# Patient Record
Sex: MALE | Race: OTHER | Employment: UNEMPLOYED | URBAN - METROPOLITAN AREA
[De-identification: names, ages, dates, MRNs, and addresses within clinical notes are randomized per-mention and may not be internally consistent; named-entity substitution may affect disease eponyms.]

---

## 2021-06-17 ENCOUNTER — APPOINTMENT (OUTPATIENT)
Dept: OCCUPATIONAL MEDICINE | Age: 21
End: 2021-06-17
Attending: FAMILY MEDICINE

## 2024-05-23 ENCOUNTER — LAB ENCOUNTER (OUTPATIENT)
Dept: LAB | Facility: HOSPITAL | Age: 24
End: 2024-05-23
Attending: INTERNAL MEDICINE

## 2024-05-23 ENCOUNTER — OFFICE VISIT (OUTPATIENT)
Facility: CLINIC | Age: 24
End: 2024-05-23

## 2024-05-23 VITALS
HEART RATE: 89 BPM | DIASTOLIC BLOOD PRESSURE: 76 MMHG | RESPIRATION RATE: 15 BRPM | HEIGHT: 73 IN | SYSTOLIC BLOOD PRESSURE: 128 MMHG | OXYGEN SATURATION: 98 % | WEIGHT: 234 LBS | BODY MASS INDEX: 31.01 KG/M2

## 2024-05-23 DIAGNOSIS — Z77.29 DUST EXPOSURE: ICD-10-CM

## 2024-05-23 DIAGNOSIS — R06.02 SHORTNESS OF BREATH: Primary | ICD-10-CM

## 2024-05-23 DIAGNOSIS — J45.40 MODERATE PERSISTENT ASTHMA WITHOUT COMPLICATION (HCC): ICD-10-CM

## 2024-05-23 DIAGNOSIS — R06.02 SHORTNESS OF BREATH: ICD-10-CM

## 2024-05-23 LAB
BASOPHILS # BLD AUTO: 0.06 X10(3) UL (ref 0–0.2)
BASOPHILS NFR BLD AUTO: 0.8 %
EOSINOPHIL # BLD AUTO: 0.56 X10(3) UL (ref 0–0.7)
EOSINOPHIL NFR BLD AUTO: 7.5 %
ERYTHROCYTE [DISTWIDTH] IN BLOOD BY AUTOMATED COUNT: 13.4 %
HCT VFR BLD AUTO: 43.3 %
HGB BLD-MCNC: 14.4 G/DL
IMM GRANULOCYTES # BLD AUTO: 0.02 X10(3) UL (ref 0–1)
IMM GRANULOCYTES NFR BLD: 0.3 %
LYMPHOCYTES # BLD AUTO: 2.48 X10(3) UL (ref 1–4)
LYMPHOCYTES NFR BLD AUTO: 33.3 %
MCH RBC QN AUTO: 26.7 PG (ref 26–34)
MCHC RBC AUTO-ENTMCNC: 33.3 G/DL (ref 31–37)
MCV RBC AUTO: 80.2 FL
MONOCYTES # BLD AUTO: 0.84 X10(3) UL (ref 0.1–1)
MONOCYTES NFR BLD AUTO: 11.3 %
NEUTROPHILS # BLD AUTO: 3.49 X10 (3) UL (ref 1.5–7.7)
NEUTROPHILS # BLD AUTO: 3.49 X10(3) UL (ref 1.5–7.7)
NEUTROPHILS NFR BLD AUTO: 46.8 %
PLATELET # BLD AUTO: 258 10(3)UL (ref 150–450)
RBC # BLD AUTO: 5.4 X10(6)UL
WBC # BLD AUTO: 7.5 X10(3) UL (ref 4–11)

## 2024-05-23 PROCEDURE — 82785 ASSAY OF IGE: CPT

## 2024-05-23 PROCEDURE — 86003 ALLG SPEC IGE CRUDE XTRC EA: CPT

## 2024-05-23 PROCEDURE — 36415 COLL VENOUS BLD VENIPUNCTURE: CPT

## 2024-05-23 PROCEDURE — 85025 COMPLETE CBC W/AUTO DIFF WBC: CPT

## 2024-05-23 PROCEDURE — 99204 OFFICE O/P NEW MOD 45 MIN: CPT | Performed by: INTERNAL MEDICINE

## 2024-05-23 RX ORDER — IPRATROPIUM BROMIDE AND ALBUTEROL SULFATE 2.5; .5 MG/3ML; MG/3ML
3 SOLUTION RESPIRATORY (INHALATION) EVERY 4 HOURS PRN
Qty: 360 EACH | Refills: 2 | Status: SHIPPED | OUTPATIENT
Start: 2024-05-23

## 2024-05-23 RX ORDER — OMEPRAZOLE 20 MG/1
20 CAPSULE, DELAYED RELEASE ORAL DAILY
COMMUNITY
Start: 2024-05-14

## 2024-05-23 RX ORDER — BUDESONIDE 0.5 MG/2ML
0.5 INHALANT ORAL 2 TIMES DAILY
Qty: 60 EACH | Refills: 3 | Status: SHIPPED | OUTPATIENT
Start: 2024-05-23

## 2024-05-23 RX ORDER — ALBUTEROL SULFATE 90 UG/1
AEROSOL, METERED RESPIRATORY (INHALATION)
COMMUNITY
Start: 2024-03-15

## 2024-05-23 NOTE — PROGRESS NOTES
[facility] General Pulmonary Consult Note    Chief Complaint:  Chief Complaint   Patient presents with    New Patient     Pt c/o dyspnea at rest and minimal exertion x6 months       History of Present Illness:  Jeison Luis is a 23 year old male who presents today for evaluation of shortness of breath.  This has been ongoing since 2023.  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  Tried prednisone back in December had significant side effects such as polyuria, palpitations.  On inspiration feels significant chest tightness and air hunger.  Has lost 13 pounds unintentionally.  Previous smoker, quit when this happened smoked for about 6 years.  History of asthma as a child.  No significant allergies.        Past Medical History:   History reviewed. No pertinent past medical history.     Past Surgical History:   Past Surgical History:   Procedure Laterality Date    Hernia surgery         Family Medical History:   Family History   Problem Relation Age of Onset    High Blood Pressure Mother         Social History:   Social History     Socioeconomic History    Marital status: Single     Spouse name: Not on file    Number of children: Not on file    Years of education: Not on file    Highest education level: Not on file   Occupational History    Not on file   Tobacco Use    Smoking status: Former     Current packs/day: 0.00     Average packs/day: 0.5 packs/day for 5.8 years (2.9 ttl pk-yrs)     Types: Cigarettes     Start date:      Quit date: 2023     Years since quittin.5     Passive exposure: Past    Smokeless tobacco: Never   Substance and Sexual Activity    Alcohol use: Not Currently    Drug use: Not Currently    Sexual activity: Not on file   Other Topics Concern    Not on file   Social History Narrative    Not on file     Social Determinants of Health     Financial Resource Strain: Not on file   Food Insecurity: Not on file   Transportation  Needs: Not on file   Physical Activity: Not on file   Stress: Not on file   Social Connections: Not on file   Housing Stability: Not on file        Allergies: Shellfish allergy     Medications:   Current Outpatient Medications   Medication Sig Dispense Refill    albuterol 108 (90 Base) MCG/ACT Inhalation Aero Soln INHALE 2 PUFFS BY MOUTH FOUR TIMES DAILY AS NEEDED AS DIRECTED      omeprazole 20 MG Oral Capsule Delayed Release Take 1 capsule (20 mg total) by mouth daily.      budesonide 0.5 MG/2ML Inhalation Suspension Take 2 mL (0.5 mg total) by nebulization 2 (two) times daily. 60 each 3    ipratropium-albuterol 0.5-2.5 (3) MG/3ML Inhalation Solution Take 3 mL by nebulization every 4 (four) hours as needed. 360 each 2    Respiratory Therapy Supplies (FULL KIT NEBULIZER SET) Does not apply Misc 1 kit As Directed. 1 each 0       Review of Systems: Review of Systems    Physical Exam:  /76 (BP Location: Right arm, Patient Position: Sitting, Cuff Size: adult)   Pulse 89   Resp 15   Ht 6' 1\" (1.854 m)   Wt 234 lb (106.1 kg)   SpO2 98%   BMI 30.87 kg/m²      Constitutional: alert, cooperative. No acute distress.  HEENT: Head NC/AT. Nares normal. Septum midline. Mucosa normal. No drainage or sinus tenderness.. Mallampati 1+  Cardio: Regular rate and rhythm. Normal S1 and S2. No murmurs.   Respiratory: Thorax symmetrical with no labored breathing. clear to auscultation bilaterally  GI: NABS. Abd soft, non-tender.  Extremities: No clubbing or cyanosis. No BLE edema.    Neurologic: A&Ox3. No gross motor deficits.  Skin: Warm, dry  Psych: Calm, cooperative. Pleasant affect.    Results:  Personally reviewed  No CBC panel on file.     No CMP panel on file.     No results found.     Assessment/Plan:  #1. Shortness of breath/dyspnea on exertion  Concern for mild asthma which is going uncontrolled  Start budesonide nebs BID, duonebs as needed in between, nebulizer would be better options since not feeling like he can  get a deep breath in  Check CT chest  Check PFTs  Check allergen screen    Return in about 6 weeks (around 7/4/2024).    Myke Henderson MD  5/23/2024

## 2024-05-25 LAB
A ALTERNATA IGE QN: <0.1 KUA/L (ref ?–0.1)
A FUMIGATUS IGE QN: <0.1 KUA/L (ref ?–0.1)
AMER SYCAMORE IGE QN: 0.3 KUA/L (ref ?–0.1)
BERMUDA GRASS IGE QN: 1.54 KUA/L (ref ?–0.1)
BOXELDER IGE QN: 0.32 KUA/L (ref ?–0.1)
C HERBARUM IGE QN: <0.1 KUA/L (ref ?–0.1)
CALIF WALNUT IGE QN: 0.4 KUA/L (ref ?–0.1)
CAT DANDER IGE QN: 7.23 KUA/L (ref ?–0.1)
CMN PIGWEED IGE QN: 0.27 KUA/L (ref ?–0.1)
COMMON RAGWEED IGE QN: 14.3 KUA/L (ref ?–0.1)
COTTONWOOD IGE QN: 0.29 KUA/L (ref ?–0.1)
D FARINAE IGE QN: 12.1 KUA/L (ref ?–0.1)
D PTERONYSS IGE QN: 13.5 KUA/L (ref ?–0.1)
DOG DANDER IGE QN: 36.2 KUA/L (ref ?–0.1)
IGE SERPL-ACNC: 278 KU/L (ref 2–214)
M RACEMOSUS IGE QN: <0.1 KUA/L (ref ?–0.1)
MARSH ELDER IGE QN: 1.97 KUA/L (ref ?–0.1)
MOUSE EPITH IGE QN: 9.54 KUA/L (ref ?–0.1)
MT JUNIPER IGE QN: 0.55 KUA/L (ref ?–0.1)
P NOTATUM IGE QN: <0.1 KUA/L (ref ?–0.1)
PECAN/HICK TREE IGE QN: 0.69 KUA/L (ref ?–0.1)
ROACH IGE QN: 4.28 KUA/L (ref ?–0.1)
SALTWORT IGE QN: 0.44 KUA/L (ref ?–0.1)
SILVER BIRCH IGE QN: 0.35 KUA/L (ref ?–0.1)
TIMOTHY IGE QN: 0.8 KUA/L (ref ?–0.1)
WHITE ASH IGE QN: 0.34 KUA/L (ref ?–0.1)
WHITE ELM IGE QN: 1.47 KUA/L (ref ?–0.1)
WHITE MULBERRY IGE QN: 0.18 KUA/L (ref ?–0.1)
WHITE OAK IGE QN: 0.33 KUA/L (ref ?–0.1)

## 2024-05-28 ENCOUNTER — TELEPHONE (OUTPATIENT)
Facility: CLINIC | Age: 24
End: 2024-05-28

## 2024-05-28 NOTE — TELEPHONE ENCOUNTER
Received fax from [employer] via Cover my meds regarding patients Budessonide 0.5mg/2ml. Used (Key: [id number])  Form filled out and sent back.

## 2024-05-30 RX ORDER — BUDESONIDE 90 UG/1
2 AEROSOL, POWDER RESPIRATORY (INHALATION) 2 TIMES DAILY
Qty: 1 EACH | Refills: 5 | Status: SHIPPED | OUTPATIENT
Start: 2024-05-30

## 2024-05-30 NOTE — TELEPHONE ENCOUNTER
Per Dr. Henderson:  change to Pulmicort.  Rx sent.  Attempted call to pt's mobile number but no answer or voicemail.  Detailed message left for pt on VM at pt's home regarding change to inhaler.

## 2024-05-30 NOTE — TELEPHONE ENCOUNTER
Budesnaldo STINSON denied.  Response from plan:    Denied. Unable to approve the medication (BUDESONIDE Suspension 0.5MG/2ML) due to unmet criteria (2,3) as outlined in the plan guideline below. Member must try and fail the following drug alternatives: Pulmicort Flexhaler, Flovent HFA/Diskus, Arnuity Ellipta, Qvar Redihale or Advair Diskus. If you are looking for additional alternatives, please refer to the plan's preferred drug list. This list can be found on the health plan's website. Unless otherwise stated please use generics when available. Plan guideline (HIM.PA.153 Inhaled Agents for Asthma and COPD) requires the following to be met prior to consideration of approval: Inhaled Agents for Asthma or Chronic Obstructive Pulmonary (COPD) (must meet all): 1. Diagnosis of asthma or COPD as Food and Drug administration (FDA)-approved for the requested agent; 2. Age is one of the following (a or b): a. Asthma: Appropriate per the prescribing information for the requested agent; b. COPD: at least 18 years; 3. Failure of the following formulary agent(s) at up to maximally indicated doses, unless clinically significant adverse effects are experienced or all are contraindicated; 4. For requests for an agent with a digital component (e.g., Digihaler products): Medical justification supports necessity of the digital component (i.e., rationale why inhaler usage cannot be tracked manually); 5. Request meets one of the following (a, b, or c): a. Requested quantity does not exceed the health plan quantity limit; b. Requested dose does not exceed the FDA-approved maximum dose for the relevant indication; c. Request is for a [place] member with asthma or other life-threatening bronchial ailments for inhalants prescribed by the treating physician. Note: If you believe the member has met criteria (2,3) as described above, please resubmit your request with additional clinically supportive documentation for consideration.    Please advise  regarding alternative from following list:  Pulmicort Flexhaler,  Arnuity Ellipta, Qvar Redihale or Advair Diskus.

## 2024-06-03 ENCOUNTER — TELEPHONE (OUTPATIENT)
Dept: CASE MANAGEMENT | Age: 24
End: 2024-06-03

## 2024-06-03 ENCOUNTER — HOSPITAL ENCOUNTER (OUTPATIENT)
Dept: GENERAL RADIOLOGY | Age: 24
Discharge: HOME | End: 2024-06-03
Attending: INTERNAL MEDICINE
Payer: COMMERCIAL

## 2024-06-03 DIAGNOSIS — R06.00 DYSPNEA, UNSPECIFIED TYPE: Primary | ICD-10-CM

## 2024-06-03 DIAGNOSIS — R06.00 DYSPNEA, UNSPECIFIED TYPE: ICD-10-CM

## 2024-06-03 PROCEDURE — 71046 X-RAY EXAM CHEST 2 VIEWS: CPT | Performed by: INTERNAL MEDICINE

## 2024-06-03 NOTE — TELEPHONE ENCOUNTER
Sasha    Jeison is scheduled for his chest CT today, 6/3/24 at 3:00, and it is in a P2P status.    If you would like to do a peer to peer you can call [facility] [phone] , and use Tracking# [id number]. Any clinical personnel can do the peer to peer.    Thank you  Amanda BERNAL

## 2024-06-03 NOTE — TELEPHONE ENCOUNTER
Spoke with MD reviewer at number provided.  Pt has not had a CXR.  Dr. Henderson notified and okay to order CXR.    Attempted 2 calls to pt to advise him that CT scan was not approved and CXR is needed.  Left message to call back.

## 2024-06-04 ENCOUNTER — TELEPHONE (OUTPATIENT)
Facility: CLINIC | Age: 24
End: 2024-06-04

## 2024-06-04 NOTE — TELEPHONE ENCOUNTER
Per Dr. Henderson: Chest xray is completely normal, would try CT again. Pt called. LVM making aware of the above. Pt advised to call CS to get the CT scan scheduled again to re-initiate the process of CT PA. Pt advised to call if michel any questions.

## 2024-06-04 NOTE — TELEPHONE ENCOUNTER
Patient is taking a nebulizer instead of Symbicort     Xray was on 06-     Ct was not approved for through insurance

## 2024-06-08 LAB
ALLERGEN BRAZIL NUT: 0.24 KUA/L (ref ?–0.1)
ALMOND IGE QN: 0.21 KUA/L (ref ?–0.1)
CASHEW NUT IGE QN: 1.1 KUA/L (ref ?–0.1)
CLAM IGE QN: 1.44 KUA/L (ref ?–0.1)
CODFISH IGE QN: <0.1 KUA/L (ref ?–0.1)
CORN IGE QN: 0.5 KUA/L (ref ?–0.1)
COW MILK IGE QN: <0.1 KUA/L (ref ?–0.1)
EGG WHITE IGE QN: 0.24 KUA/L (ref ?–0.1)
HAZELNUT IGE QN: 3.47 KUA/L (ref ?–0.1)
IGE SERPL-ACNC: 206 KU/L (ref 2–214)
PEANUT IGE QN: 5.53 KUA/L (ref ?–0.1)
SALMON IGE QN: <0.1 KUA/L (ref ?–0.1)
SCALLOP IGE QN: 1.91 KUA/L (ref ?–0.1)
SESAME SEED IGE QN: 0.34 KUA/L (ref ?–0.1)
SHRIMP IGE QN: 5.83 KUA/L (ref ?–0.1)
SOYBEAN IGE QN: 0.19 KUA/L (ref ?–0.1)
WALNUT IGE QN: 5.99 KUA/L (ref ?–0.1)
WHEAT IGE QN: 0.33 KUA/L (ref ?–0.1)

## 2024-06-13 ENCOUNTER — TELEPHONE (OUTPATIENT)
Facility: CLINIC | Age: 24
End: 2024-06-13

## 2024-06-13 NOTE — TELEPHONE ENCOUNTER
Left detailed message letting him know we need his authorization in writing in order to appeal CT scan order. Awaiting to hear from pt.

## 2024-06-13 NOTE — TELEPHONE ENCOUNTER
Patient is calling ct scan was denied by the insurance the second time.  And needs a pre authorization letter.

## 2024-06-14 ENCOUNTER — TELEPHONE (OUTPATIENT)
Facility: CLINIC | Age: 24
End: 2024-06-14

## 2024-06-14 NOTE — TELEPHONE ENCOUNTER
Pt has not received Representative Designation Form to give our office permission to appeal CT scan denial.  Pt will come to our office to complete.

## 2024-06-19 ENCOUNTER — OFFICE VISIT (OUTPATIENT)
Dept: RHEUMATOLOGY | Facility: CLINIC | Age: 24
End: 2024-06-19

## 2024-06-19 VITALS
HEIGHT: 73 IN | HEART RATE: 94 BPM | BODY MASS INDEX: 30.35 KG/M2 | WEIGHT: 229 LBS | RESPIRATION RATE: 17 BRPM | DIASTOLIC BLOOD PRESSURE: 81 MMHG | SYSTOLIC BLOOD PRESSURE: 133 MMHG

## 2024-06-19 DIAGNOSIS — R06.02 SHORTNESS OF BREATH: ICD-10-CM

## 2024-06-19 DIAGNOSIS — M54.6 CHRONIC THORACIC BACK PAIN, UNSPECIFIED BACK PAIN LATERALITY: ICD-10-CM

## 2024-06-19 DIAGNOSIS — G89.29 CHRONIC THORACIC BACK PAIN, UNSPECIFIED BACK PAIN LATERALITY: ICD-10-CM

## 2024-06-19 DIAGNOSIS — R53.83 FATIGUE, UNSPECIFIED TYPE: Primary | ICD-10-CM

## 2024-06-19 PROCEDURE — 99204 OFFICE O/P NEW MOD 45 MIN: CPT | Performed by: INTERNAL MEDICINE

## 2024-06-19 RX ORDER — LOSARTAN POTASSIUM 25 MG/1
25 TABLET ORAL DAILY
COMMUNITY
Start: 2024-06-07

## 2024-06-19 NOTE — PROGRESS NOTES
RHEUMATOLOGY CLINIC- NEW PATIENT    Jeison Luis is a 23 year old male.    ASSESSMENT/PLAN:       ICD-10-CM    1. Fatigue, unspecified type  R53.83 Allergy Referral - In Network      2. Shortness of breath  R06.02 Allergy Referral - In Network      3. Chronic thoracic back pain, unspecified back pain laterality  M54.6 Allergy Referral - In Network    G89.29         DISCUSSION:  Patient presents as a new outpatient referral for several different symptoms after being treated with a Medrol Dosepak and antibiotics for shortness of breath.  Symptoms have persisted despite the last dose being around November.  Prior lab work reviewed and notable for negative DANICA and RF.  At this time, patient does not have features diagnostic of an underlying connective tissue disease nor inflammatory arthritis.  Discussed with patient would avoid methylprednisolone going forward given possible medication side effect.  Referral given for further discussion with allergy/immunology.    PLAN:  -Referral given for allergy/immunology  - Prior lab work reviewed with patient.  No additional rheumatologic patient may follow-up as needed workup indicated at this time.  - Consult/evaluation communicated with referring physician/provider.    Patient may follow-up as needed    Keke Fuentes DO  6/19/2024   2:43 PM    HPI:     Chief Complaint   Patient presents with    Back Pain    Joint Pain    Finger Pain    Foot Pain    Fatigue       I had the pleasure of seeing Jeison Luis on 6/19/2024 as a new outpatient consultation for polyarthralgias. The patient was originally referred by his PCP, Kulwant Da Silva.     23 year old male w/ PMH Gastritis on omeprazole, HTN, Asthma who presents to clinic today.  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  He was treated with a Medrol Dosepak and antibiotics but ultimately felt worsening shortness of breath as well as palpitations,  abdominal discomfort and distention.  He stopped the Medrol Dosepak around November but continued to have multiple symptoms including episodes of skin darkening and paleness, states that his nails look different,, lip dryness despite hydration, and generalized malaise and weakness.  Overall, states symptoms are relatively new since the Medrol Dosepak.  Also has some chronic mid back pain that is worse with activity and history of dermatitis.  Reports poor appetite since symptoms started and decreased exercise tolerance.  ROS otherwise negative for measured fevers, chills, photosensitive rash, oral/nasal ulcers, history of serositis, history of uveitis/iritis, hematuria.  No family history of autoimmune disease such as gout, lupus, rheumatoid arthritis, psoriasis.      Current Medications:  N/A    Medication History:  Medrol Dosepak- worsening symptoms    Interval History:   See Above     HISTORY:  History reviewed. No pertinent past medical history.   Social Hx Reviewed   Family Hx Reviewed     Medications (Active prior to today's visit):  Current Outpatient Medications   Medication Sig Dispense Refill    losartan 25 MG Oral Tab Take 1 tablet (25 mg total) by mouth daily.      albuterol 108 (90 Base) MCG/ACT Inhalation Aero Soln INHALE 2 PUFFS BY MOUTH FOUR TIMES DAILY AS NEEDED AS DIRECTED      omeprazole 20 MG Oral Capsule Delayed Release Take 1 capsule (20 mg total) by mouth daily.      ipratropium-albuterol 0.5-2.5 (3) MG/3ML Inhalation Solution Take 3 mL by nebulization every 4 (four) hours as needed. 360 each 2       Allergies:  Allergies   Allergen Reactions    Shellfish Allergy HIVES         ROS:   Review of Systems   Constitutional:  Negative for chills and fever.   HENT:  Negative for congestion, hearing loss, mouth sores, nosebleeds and trouble swallowing.    Eyes:  Negative for photophobia, pain, redness and visual disturbance.   Respiratory:  Positive for shortness of breath. Negative for cough.     Cardiovascular:  Positive for chest pain. Negative for palpitations and leg swelling.   Gastrointestinal:  Positive for abdominal distention. Negative for abdominal pain, blood in stool, diarrhea and nausea.   Endocrine: Negative for cold intolerance and heat intolerance.   Genitourinary:  Negative for dysuria, frequency and hematuria.   Musculoskeletal:  Positive for arthralgias and back pain. Negative for gait problem, joint swelling, myalgias, neck pain and neck stiffness.   Skin:  Negative for color change and rash.   Neurological:  Positive for dizziness and weakness. Negative for numbness and headaches.   Psychiatric/Behavioral:  Negative for confusion and dysphoric mood.         PHYSICAL EXAM:     Constitutional:  Well developed, Well nourished, No acute distress  HENT:  Normocephalic, Atraumatic, Bilateral external ears normal, Oropharynx moist, No oral exudates.  Neck: Normal range of motion, No tenderness, Supple, No stridor.  Eyes:  PERRL, EOMI, Conjunctiva normal, No discharge.  Respiratory:  Normal breath sounds, No respiratory distress, No wheezing.  Cardiovascular:  Normal heart rate, Normal rhythm, No murmurs, No rubs, No gallops.  GI:  Bowel sounds normal, Soft, No tenderness, No masses, No pulsatile masses.  : No CVA tenderness.  Musculoskeletal:  A comprehensive 28 count joint exam was done and was negative for swelling or tenderness except as noted. Inspections for misalignment, asymmetry, crepitation, defects, tenderness, masses, nodules, effusions, range of motion, and stability in the upper and lower extremities bilaterally are all normal unless noted.           Integument:  Warm, Dry, No erythema, No rash.  Lymphatic:  No lymphadenopathy noted.  Neurologic:  Alert & oriented x 3, Normal motor function, Normal sensory function, No focal deficits noted.  Psychiatric:  Affect normal, Judgment normal, Mood normal.    LABS:   Prior lab work reviewed and notable for:    1/17/2024:  TSH 0.662  WNL  ESR 17 borderline high, CRP less than 0.4 WNL    1/5/2024:  CBC with normal WBC, Hg, PLT  CMP with normal renal function, nonelevated LFTs, no gamma gap noted    4/4/2023:  DANICA multiplex negative  RA factor negative  ESR 7 WNL    Imaging:       N/A

## 2024-06-20 ENCOUNTER — RT VISIT (OUTPATIENT)
Dept: RESPIRATORY THERAPY | Facility: HOSPITAL | Age: 24
End: 2024-06-20
Attending: INTERNAL MEDICINE

## 2024-06-20 DIAGNOSIS — R06.02 SHORTNESS OF BREATH: ICD-10-CM

## 2024-06-20 DIAGNOSIS — Z77.29 DUST EXPOSURE: ICD-10-CM

## 2024-06-24 ENCOUNTER — TELEPHONE (OUTPATIENT)
Facility: CLINIC | Age: 24
End: 2024-06-24

## 2024-06-24 PROBLEM — R06.02 SHORTNESS OF BREATH: Status: ACTIVE | Noted: 2024-06-24

## 2024-06-24 PROCEDURE — 94729 DIFFUSING CAPACITY: CPT | Performed by: INTERNAL MEDICINE

## 2024-06-24 PROCEDURE — 94726 PLETHYSMOGRAPHY LUNG VOLUMES: CPT | Performed by: INTERNAL MEDICINE

## 2024-06-24 PROCEDURE — 94010 BREATHING CAPACITY TEST: CPT | Performed by: INTERNAL MEDICINE

## 2024-06-24 NOTE — TELEPHONE ENCOUNTER
CT ordered by Dr Henderson on 05/23/24. Patient has not completed CT testing. Patient did complete labs and PFT's.   MY CHART MESSAGE sent to patient to notify of the above.

## 2024-06-24 NOTE — TELEPHONE ENCOUNTER
Per Elina's RN note of 6-24-24:  The insurance wants a doctor's note stating the doctor has reviewed the chest xray, labs and pulmonary function test. Dr. Henderson wants to see you, either in person or via telehealth.   Pt notified of above and will see Dr. Henderson on 7-2-24.

## 2024-06-24 NOTE — PROCEDURES
Findings:  FEV1 is 4.71L, z-score of -0.21.  FVC is 5.69L, z-score of -0.07.  FEV1/ FVC ratio is 0.83.  The flow-volume loop demonstrates a normal pattern.  The TLC is 7.37L, z-score of -0.31.  The residual volume 1.68L, z-score of 0.09.  The diffusion capacity is 33.12 with z-score of -0.66. When corrected for alveolar volume, the diffusion capacity is 4.84 with z-score of -0.38.  Impression:  There is no airway obstruction on spirometry and visualized on flow-volume loop.  Lung volumes are normal.  Diffusion capacity is normal.  There are no previous pulmonary function tests available for comparison.   Of note, this testing was performed in accordance to ATS/ERS interpretation guidelines with the use of upper and lower limits of normal as well as z-score references. Previous testing (before March 2024) was not performed at [facility] using z-scores and so comparison to previous testing should be taken with caution.

## 2024-08-05 ENCOUNTER — OFFICE VISIT (OUTPATIENT)
Facility: CLINIC | Age: 24
End: 2024-08-05
Payer: COMMERCIAL

## 2024-08-05 VITALS
SYSTOLIC BLOOD PRESSURE: 136 MMHG | HEART RATE: 85 BPM | BODY MASS INDEX: 30.35 KG/M2 | DIASTOLIC BLOOD PRESSURE: 78 MMHG | OXYGEN SATURATION: 99 % | WEIGHT: 229 LBS | HEIGHT: 73 IN | RESPIRATION RATE: 16 BRPM

## 2024-08-05 DIAGNOSIS — J45.40 MODERATE PERSISTENT ASTHMA WITHOUT COMPLICATION (HCC): Primary | ICD-10-CM

## 2024-08-05 PROCEDURE — 99214 OFFICE O/P EST MOD 30 MIN: CPT | Performed by: INTERNAL MEDICINE

## 2024-08-05 RX ORDER — BUDESONIDE AND FORMOTEROL FUMARATE DIHYDRATE 160; 4.5 UG/1; UG/1
2 AEROSOL RESPIRATORY (INHALATION) 2 TIMES DAILY
Qty: 10.2 G | Refills: 5 | Status: SHIPPED | OUTPATIENT
Start: 2024-08-05

## 2024-08-05 RX ORDER — BUDESONIDE 90 UG/1
2 AEROSOL, POWDER RESPIRATORY (INHALATION) 2 TIMES DAILY
COMMUNITY
Start: 2024-07-10

## 2024-08-05 RX ORDER — MONTELUKAST SODIUM 10 MG/1
10 TABLET ORAL NIGHTLY
Qty: 30 TABLET | Refills: 3 | Status: SHIPPED | OUTPATIENT
Start: 2024-08-05

## 2024-08-05 NOTE — PROGRESS NOTES
Health system Pulmonary Follow Up Note    Chief Complaint:  Chief Complaint   Patient presents with    Follow - Up     Follow up on labs, CXR 6/3 and PFT        History of Present Illness:  Jeison Luis is a 23 year old male who presents today for follow up of shortness of breath.  This has been ongoing since 2023.  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  Tried prednisone back in December had significant side effects such as polyuria, palpitations.  On inspiration feels significant chest tightness and air hunger.  Has lost 13 pounds unintentionally.  Previous smoker, quit when this happened smoked for about 6 years.  History of asthma as a child.  No significant allergies.      Interval history:  Since last visit, started pulmicort with some relief in breathing but not completely.  Still getting winded especially at night.  When layign flat feeling like he can't breathe.  Allergen screen with significant allergies noted.      Past Medical History:   History reviewed. No pertinent past medical history.     Past Surgical History:   Past Surgical History:   Procedure Laterality Date    Hernia surgery         Family Medical History:   Family History   Problem Relation Age of Onset    High Blood Pressure Mother         Social History:   Social History     Socioeconomic History    Marital status: Single     Spouse name: Not on file    Number of children: Not on file    Years of education: Not on file    Highest education level: Not on file   Occupational History    Not on file   Tobacco Use    Smoking status: Former     Current packs/day: 0.00     Average packs/day: 0.5 packs/day for 5.8 years (2.9 ttl pk-yrs)     Types: Cigarettes     Start date:      Quit date: 2023     Years since quittin.7     Passive exposure: Past    Smokeless tobacco: Never   Substance and Sexual Activity    Alcohol use: Not Currently    Drug use: Not Currently    Sexual  activity: Not on file   Other Topics Concern    Not on file   Social History Narrative    Not on file     Social Determinants of Health     Financial Resource Strain: Not on file   Food Insecurity: Not on file   Transportation Needs: Not on file   Physical Activity: Not on file   Stress: Not on file   Social Connections: Not on file   Housing Stability: Not on file        Medications:   Current Outpatient Medications   Medication Sig Dispense Refill    PULMICORT FLEXHALER 90 MCG/ACT Inhalation Aerosol Powder, Breath Activated Inhale 2 puffs into the lungs 2 (two) times daily.      montelukast 10 MG Oral Tab Take 1 tablet (10 mg total) by mouth nightly. 30 tablet 3    Budesonide-Formoterol Fumarate (SYMBICORT) 160-4.5 MCG/ACT Inhalation Aerosol Inhale 2 puffs into the lungs 2 (two) times daily. 10.2 g 5    losartan 25 MG Oral Tab Take 1 tablet (25 mg total) by mouth daily.      albuterol 108 (90 Base) MCG/ACT Inhalation Aero Soln INHALE 2 PUFFS BY MOUTH FOUR TIMES DAILY AS NEEDED AS DIRECTED      omeprazole 20 MG Oral Capsule Delayed Release Take 1 capsule (20 mg total) by mouth daily.      ipratropium-albuterol 0.5-2.5 (3) MG/3ML Inhalation Solution Take 3 mL by nebulization every 4 (four) hours as needed. 360 each 2       Review of Systems: Review of Systems     Physical Exam:  /78 (BP Location: Right arm, Patient Position: Sitting, Cuff Size: adult)   Pulse 85   Resp 16   Ht 6' 1\" (1.854 m)   Wt 229 lb (103.9 kg)   SpO2 99%   BMI 30.21 kg/m²      Constitutional: alert, cooperative. No acute distress.  HEENT: Head NC/AT. Nares normal. Septum midline. Mucosa normal. No drainage or sinus tenderness.  Cardio: Regular rate and rhythm. Normal S1 and S2. No murmurs.   Respiratory: Thorax symmetrical with no labored breathing. clear to auscultation bilaterally  Extremities: No clubbing or cyanosis. No BLE edema.    Neurologic: A&Ox3. No gross motor deficits.  Skin: Warm, dry  Psych: Calm, cooperative. Pleasant  affect.    Results:  Personally reviewed  XR CHEST PA + LAT CHEST (ZMO=[id number])  Narrative: PROCEDURE: XR CHEST PA + LAT CHEST (CPT=71046)     COMPARISON: None.     INDICATIONS: Chronic shortness of breath. Former smoker.     TECHNIQUE:   Two views.       FINDINGS:   CARDIAC/VASC: Normal cardiac silhouette..  Unremarkable pulmonary vasculature.    MEDIAST/RADHA: No visible mass or adenopathy.   LUNGS/PLEURA: No significant pulmonary parenchymal abnormalities.  No effusion or pleural thickening.    BONES: Levoscoliosis and multilevel spondylosis  OTHER: Negative.                Impression: CONCLUSION:   Normal chest radiographs.           Dictated by (CST): Davie Barlow MD on 6/04/2024 at 10:13 AM       Finalized by (CST): Davie Barlow MD on 6/04/2024 at 10:15 AM              PFTs:       No data to display                   No data to display                    WBC: 7.5, done on 5/23/2024.  HGB: 14.4, done on 5/23/2024.  PLT: 258, done on 5/23/2024.     No CMP panel on file.     XR CHEST PA + LAT CHEST (CPT=71046)    Result Date: 6/4/2024  CONCLUSION:  Normal chest radiographs.    Dictated by (CST): Davie Barlow MD on 6/04/2024 at 10:13 AM     Finalized by (CST): Davie Barlow MD on 6/04/2024 at 10:15 AM            Assessment/Plan:  #1. Moderate persistent allergic asthma  Step up to symbicort or equivalent  Continue ongoing nebulizers  Start singulair  Reviewed allergy screen with patient, I suspect may need to get rid of his dog  All questions answered      Return in about 3 months (around 11/5/2024).    I spent 30 minutes obtaining and reviewing records, preparing for the visit including reviewing chart and prior testing, face to face time examining the patient and obtaining history, counseling, arranging and reviewing office-based testing, independently reviewing relevant studies and documentation exclusive of other billable procedures.      Myke Henderson MD  8/5/2024

## 2024-08-27 ENCOUNTER — OFFICE VISIT (OUTPATIENT)
Facility: LOCATION | Age: 24
End: 2024-08-27

## 2024-08-27 VITALS — WEIGHT: 234 LBS | BODY MASS INDEX: 31.01 KG/M2 | HEIGHT: 73 IN

## 2024-08-27 DIAGNOSIS — R09.82 POSTNASAL DRIP: ICD-10-CM

## 2024-08-27 DIAGNOSIS — K21.9 LARYNGOPHARYNGEAL REFLUX (LPR): Primary | ICD-10-CM

## 2024-08-27 DIAGNOSIS — J30.1 ALLERGIC RHINITIS DUE TO POLLEN, UNSPECIFIED SEASONALITY: ICD-10-CM

## 2024-08-27 DIAGNOSIS — R05.3 CHRONIC COUGH: ICD-10-CM

## 2024-08-27 PROCEDURE — 31575 DIAGNOSTIC LARYNGOSCOPY: CPT | Performed by: STUDENT IN AN ORGANIZED HEALTH CARE EDUCATION/TRAINING PROGRAM

## 2024-08-27 PROCEDURE — 99203 OFFICE O/P NEW LOW 30 MIN: CPT | Performed by: STUDENT IN AN ORGANIZED HEALTH CARE EDUCATION/TRAINING PROGRAM

## 2024-08-27 RX ORDER — OMEPRAZOLE 40 MG/1
40 CAPSULE, DELAYED RELEASE ORAL DAILY
Qty: 30 CAPSULE | Refills: 2 | Status: SHIPPED | OUTPATIENT
Start: 2024-08-27

## 2024-08-27 RX ORDER — FLUTICASONE PROPIONATE 50 MCG
2 SPRAY, SUSPENSION (ML) NASAL 2 TIMES DAILY
Qty: 16 G | Refills: 3 | Status: SHIPPED | OUTPATIENT
Start: 2024-08-27

## 2024-08-27 RX ORDER — AZELASTINE 1 MG/ML
2 SPRAY, METERED NASAL 2 TIMES DAILY
Qty: 30 ML | Refills: 3 | Status: SHIPPED | OUTPATIENT
Start: 2024-08-27

## 2024-08-27 NOTE — PROGRESS NOTES
[facility]  OTOLARYNGOLOGY - HEAD & NECK SURGERY    8/27/2024     Reason for Consultation:   Dry cough, nasal discharge, shortness of breath    History of Present Illness:   Patient is a pleasant 23 year old male who is being seen for approximately 1 year of symptoms which began after working a certain job last year.  He states that he was exposed to a certain dust in the air.  He states since then he has had difficulty breathing and is even being followed by a pulmonologist.  He has had multiple different tests including PFTs, CT chest without contrast, CT chest with contrast, chest x-ray, which were all normal.  He has also been having this dry cough.  He states the dry cough is slightly better however still persistent.  He has been on multiple different inhalers for this.  He does have a history of allergic rhinitis and has been sneezing a lot recently.  He is not on any nasal sprays currently.  He is here to find out what is causing his dry cough.  He does not have any significant symptoms of acid reflux, and has seen a GI specialist in the past and had endoscopy.  He is currently taking a PPI with 20 mg daily.    Past Medical History  History reviewed. No pertinent past medical history.    Past Surgical History  Past Surgical History:   Procedure Laterality Date    Hernia surgery  2010       Family History  Family History   Problem Relation Age of Onset    High Blood Pressure Mother        Social History  Pediatric History   Patient Parents    paz bucio (Mother)     Other Topics Concern    Not on file   Social History Narrative    Not on file           Current Medications:  Current Outpatient Medications   Medication Sig Dispense Refill    fluticasone propionate 50 MCG/ACT Nasal Suspension 2 sprays by Nasal route 2 (two) times daily. 16 g 3    azelastine 0.1 % Nasal Solution 2 sprays by Nasal route 2 (two) times daily. 30 mL 3    Omeprazole 40 MG Oral Capsule Delayed Release Take 1 capsule (40 mg total) by  mouth daily. Before a meal 30 capsule 2    PULMICORT FLEXHALER 90 MCG/ACT Inhalation Aerosol Powder, Breath Activated Inhale 2 puffs into the lungs 2 (two) times daily.      montelukast 10 MG Oral Tab Take 1 tablet (10 mg total) by mouth nightly. 30 tablet 3    Budesonide-Formoterol Fumarate (SYMBICORT) 160-4.5 MCG/ACT Inhalation Aerosol Inhale 2 puffs into the lungs 2 (two) times daily. 10.2 g 5    losartan 25 MG Oral Tab Take 1 tablet (25 mg total) by mouth daily.      albuterol 108 (90 Base) MCG/ACT Inhalation Aero Soln INHALE 2 PUFFS BY MOUTH FOUR TIMES DAILY AS NEEDED AS DIRECTED      omeprazole 20 MG Oral Capsule Delayed Release Take 1 capsule (20 mg total) by mouth daily.      ipratropium-albuterol 0.5-2.5 (3) MG/3ML Inhalation Solution Take 3 mL by nebulization every 4 (four) hours as needed. 360 each 2       Allergies  Allergies   Allergen Reactions    Shellfish Allergy HIVES    Prednisone OTHER (SEE COMMENTS)     Sweating       Review of Systems:   A comprehensive 10 point review of systems was completed.  Pertinent positives and negatives noted in the the HPI.    Physical Exam:   Height 6' 1\" (1.854 m), weight 234 lb (106.1 kg).    GENERAL: No acute distress, Comfortable appearing  FACE: HB 1/6, Normal Animation  HEAD: Normocephalic  EYES: EOMI, pupils equil  EARS: Bilateral Auricles Symmetric, bilateral tympanic membranes are normal  NOSE: Nares patent bilaterally  ORAL CAVITY: Tongue mobile, Oropharynx clear, Floor of mouth clear, Posterior oropharynx normal  NECK: No palpable lymphadenopathy, thyroid not palpable, nontender    PROCEDURE: FLEXIBLE FIBEROPTIC LARYNGOSCOPY (33392)    Due to inability for adequate examination of the larynx and need for magnification to perform the examination, endoscopy was performed.  Risks and benefits were discussed with patient/family and they have given verbal consent to proceed.    Procedure Detail & Findings:     After placement of topical anesthetic intranasally  the flexible laryngoscope was inserted into the nare and driven through the nasal cavity with no significant abnormal findings noted in the nasal cavities or nasopharynx. The oropharynx, hypopharynx and larynx were subsequently examined and the following findings were noted:    Base of Tongue: Normal    Valeculla: Normal    Arytenoids: Normal    Introitus of the esophagus: Moderate edema    Epiglottis: Normal    False vocal cords: Normal    True vocal cords: Normal    Subglottic space: Normal    Mobility of True vocal cords: Normal    Condition: Stable    Complications: Patient tolerated the procedure well with no immediate complication.    Results:     Laboratory Data:  Lab Results   Component Value Date    WBC 7.5 05/23/2024    HGB 14.4 05/23/2024    HCT 43.3 05/23/2024    .0 05/23/2024         Imaging:  No results found.      Impression:       ICD-10-CM    1. Laryngopharyngeal reflux (LPR)  K21.9       2. Chronic cough  R05.3       3. Allergic rhinitis due to pollen, unspecified seasonality  J30.1           Recommendations:  I do think that the patient's cough is likely due to laryngal pharyngeal reflux.  He also has postnasal drip on exam.  I would like to start him on Flonase and azelastine in combination.  Additionally I will have him increase his omeprazole dose to 40 mg temporarily.  He will return to see me if he has persistent symptoms    Thank you for allowing me to participate in the care of your patient.    Abebe Razo,    Otolaryngology/Rhinology, Sinus, and Endoscopic Skull Base Surgery  [address] [place] [id number]  Phone [phone]  Fax [phone]  8/27/2024  11:33 AM  8/27/2024

## 2024-09-14 ENCOUNTER — PATIENT MESSAGE (OUTPATIENT)
Facility: CLINIC | Age: 24
End: 2024-09-14

## 2024-09-14 DIAGNOSIS — J45.40: ICD-10-CM

## 2024-09-14 DIAGNOSIS — R63.4 WEIGHT LOSS, UNINTENTIONAL: Primary | ICD-10-CM

## 2024-09-14 DIAGNOSIS — R06.02 SHORTNESS OF BREATH: ICD-10-CM

## 2024-10-21 ENCOUNTER — PATIENT MESSAGE (OUTPATIENT)
Dept: ADMINISTRATIVE | Age: 24
End: 2024-10-21

## 2024-10-21 NOTE — TELEPHONE ENCOUNTER
PENDING STATUS  Patient should reschedule.   Attempted to reach out to patient by phone and/or MICMALIhart.  Insurance will not cover without approval.      CT CHEST (QAD=46562)     RAUL  online for status    Referral #: [id number]      Scheduled For: 10/23/2024     Status: pending authorization    Case Number: [id number]      Clinical notes sent for review.     Patient notified of pending status via QRGL.     Appt Desk > Noted

## 2024-10-23 ENCOUNTER — HOSPITAL ENCOUNTER (OUTPATIENT)
Dept: CT IMAGING | Facility: HOSPITAL | Age: 24
Discharge: HOME | End: 2024-10-23
Attending: INTERNAL MEDICINE
Payer: COMMERCIAL

## 2024-10-23 DIAGNOSIS — R63.4 WEIGHT LOSS, UNINTENTIONAL: ICD-10-CM

## 2024-10-23 DIAGNOSIS — R06.02 SHORTNESS OF BREATH: ICD-10-CM

## 2024-10-23 DIAGNOSIS — J45.40: ICD-10-CM

## 2024-10-23 PROCEDURE — 71250 CT THORAX DX C-: CPT | Performed by: INTERNAL MEDICINE

## 2024-11-14 ENCOUNTER — OFFICE VISIT (OUTPATIENT)
Age: 24
End: 2024-11-14
Payer: COMMERCIAL

## 2024-11-14 VITALS
BODY MASS INDEX: 31.14 KG/M2 | RESPIRATION RATE: 16 BRPM | DIASTOLIC BLOOD PRESSURE: 72 MMHG | SYSTOLIC BLOOD PRESSURE: 134 MMHG | OXYGEN SATURATION: 96 % | HEIGHT: 73 IN | WEIGHT: 235 LBS | HEART RATE: 111 BPM

## 2024-11-14 DIAGNOSIS — J45.50 SEVERE PERSISTENT ASTHMA WITHOUT COMPLICATION (HCC): Primary | ICD-10-CM

## 2024-11-14 PROCEDURE — 99214 OFFICE O/P EST MOD 30 MIN: CPT | Performed by: INTERNAL MEDICINE

## 2024-12-02 ENCOUNTER — TELEPHONE (OUTPATIENT)
Dept: ALLERGY | Facility: CLINIC | Age: 24
End: 2024-12-02

## 2024-12-02 NOTE — TELEPHONE ENCOUNTER
Received a referral from [facility] , Maynor Rose PA-C located in [place], [place].- [facility].    Patient no showed on 10/8/24 with Dr. Dao.    Patient no showed on 11/4/24 with Dr. De.    Will send referral to scan.

## 2024-12-16 NOTE — PROGRESS NOTES
[facility] Pulmonary Follow Up Note    Chief Complaint:  Chief Complaint   Patient presents with    Follow - Up     Follow up on CT 10/23       History of Present Illness:  Jeison Luis is a 24 year old male who presents today for follow up of shortness of breath.  This has been ongoing since 2023.  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  Tried prednisone back in December had significant side effects such as polyuria, palpitations.  On inspiration feels significant chest tightness and air hunger.  Has lost 13 pounds unintentionally.  Previous smoker, quit when this happened smoked for about 6 years.  History of asthma as a child.  No significant allergies.     Interval history:  Since last visit, patent started on budesonide nebs in addition to symbicort with some good improvement in breathing      Past Medical History:   History reviewed. No pertinent past medical history.     Past Surgical History:   Past Surgical History:   Procedure Laterality Date    Hernia surgery         Family Medical History:   Family History   Problem Relation Age of Onset    High Blood Pressure Mother         Social History:   Social History     Socioeconomic History    Marital status: Single     Spouse name: Not on file    Number of children: Not on file    Years of education: Not on file    Highest education level: Not on file   Occupational History    Not on file   Tobacco Use    Smoking status: Former     Current packs/day: 0.00     Average packs/day: 0.5 packs/day for 5.8 years (2.9 ttl pk-yrs)     Types: Cigarettes     Start date:      Quit date: 2023     Years since quittin.1     Passive exposure: Past    Smokeless tobacco: Never   Vaping Use    Vaping status: Not on file   Substance and Sexual Activity    Alcohol use: Yes    Drug use: Never    Sexual activity: Not on file   Other Topics Concern    Not on file   Social History Narrative    Not on file      Social Drivers of Health     Financial Resource Strain: Not on file   Food Insecurity: Not on file   Transportation Needs: Not on file   Physical Activity: Not on file   Stress: Not on file   Social Connections: Not on file   Housing Stability: Low Risk  (11/10/2024)    Received from [facility]    Housing Stability     Mortgage Payment Concerns?: Not on file     Number of Places Lived in the Last Year: Not on file     Unstable Housing?: Not on file        Medications:   Current Outpatient Medications   Medication Sig Dispense Refill    fluticasone propionate 50 MCG/ACT Nasal Suspension 2 sprays by Nasal route 2 (two) times daily. 16 g 3    azelastine 0.1 % Nasal Solution 2 sprays by Nasal route 2 (two) times daily. 30 mL 3    Omeprazole 40 MG Oral Capsule Delayed Release Take 1 capsule (40 mg total) by mouth daily. Before a meal 30 capsule 2    PULMICORT FLEXHALER 90 MCG/ACT Inhalation Aerosol Powder, Breath Activated Inhale 2 puffs into the lungs 2 (two) times daily.      montelukast 10 MG Oral Tab Take 1 tablet (10 mg total) by mouth nightly. 30 tablet 3    Budesonide-Formoterol Fumarate (SYMBICORT) 160-4.5 MCG/ACT Inhalation Aerosol Inhale 2 puffs into the lungs 2 (two) times daily. 10.2 g 5    losartan 25 MG Oral Tab Take 1 tablet (25 mg total) by mouth daily.      albuterol 108 (90 Base) MCG/ACT Inhalation Aero Soln INHALE 2 PUFFS BY MOUTH FOUR TIMES DAILY AS NEEDED AS DIRECTED      omeprazole 20 MG Oral Capsule Delayed Release Take 1 capsule (20 mg total) by mouth daily.      ipratropium-albuterol 0.5-2.5 (3) MG/3ML Inhalation Solution Take 3 mL by nebulization every 4 (four) hours as needed. 360 each 2       Review of Systems: Review of Systems     Physical Exam:  /72 (BP Location: Right arm, Patient Position: Sitting, Cuff Size: adult)   Pulse 111   Resp 16   Ht 6' 1\" (1.854 m)   Wt 235 lb (106.6 kg)   SpO2 96%   BMI 31.00 kg/m²      Constitutional: alert, cooperative. No  acute distress.  HEENT: Head NC/AT. Nares normal. Septum midline. Mucosa normal. No drainage or sinus tenderness.  Cardio: Regular rate and rhythm. Normal S1 and S2. No murmurs.   Respiratory: Thorax symmetrical with no labored breathing. clear to auscultation bilaterally  Extremities: No clubbing or cyanosis. No BLE edema.    Neurologic: A&Ox3. No gross motor deficits.  Skin: Warm, dry  Psych: Calm, cooperative. Pleasant affect.    Results:  Personally reviewed  CT CHEST (CPT=71250)  Narrative: PROCEDURE:  CT CHEST (CPT=71250)     COMPARISON:  None.     INDICATIONS:  J45.40 Asthma in adult, moderate persistent, uncomplicated (HCC) R06.02 Shortness of breath R63.4 Weight loss, unintentional     TECHNIQUE:  Unenhanced multislice CT scanning is performed through the chest.  Dose reduction techniques were used. Dose information is transmitted to the ACR (American College of Radiology) NRDR (National Radiology Data Registry) which includes the Dose   Index Registry.     PATIENT STATED HISTORY: (As transcribed by Technologist)  weight loss.  unintentional shortness of breath.           FINDINGS:    LUNGS:  No visible pulmonary disease.    VASCULATURE:  Pulmonary vessels are unremarkable within the limits of a noncontrast CT.    RADHA:  No enlarged adenopathy.    MEDIASTINUM:  Slight ground-glass opacity in the anterior superior mediastinum consistent with residual thymus.  No enlarged adenopathy.    CARDIAC:  No enlargement, pericardial thickening, or significant coronary artery calcification.  PLEURA:  No pneumothorax or effusion.    THORACIC AORTA:  Unremarkable as seen on non-contrast imaging.   CHEST WALL:  Bilateral mildly prominent axillary lymph nodes..    LIMITED ABDOMEN:  Limited images of the upper abdomen are unremarkable.    BONES:  No bony lesion or fracture.                     Impression: CONCLUSION:  No focal pulmonary opacity, infiltrate or consolidation.  No appreciable   bronchiectasis or peribronchial  thickening.     Mildly prominent axillary lymph nodes bilaterally.     No enlarged mediastinal or hilar adenopathy.         LOCATION:  [id number]        Dictated by (CST): Yaritza Gonzalez MD on 10/23/2024 at 2:07 PM       Finalized by (CST): Yaritza Gonzalez MD on 10/23/2024 at 2:10 PM         PFTs:       No data to display                   No data to display                    WBC: 7.5, done on 5/23/2024.  HGB: 14.4, done on 5/23/2024.  PLT: 258, done on 5/23/2024.     No CMP panel on file.     CT CHEST (CPT=71250)    Result Date: 10/23/2024  CONCLUSION:  No focal pulmonary opacity, infiltrate or consolidation.  No appreciable  bronchiectasis or peribronchial thickening.  Mildly prominent axillary lymph nodes bilaterally.  No enlarged mediastinal or hilar adenopathy.    LOCATION:  [id number]   Dictated by (CST): Yaritza Gonzalez MD on 10/23/2024 at 2:07 PM     Finalized by (CST): Yaritza Gonzalez MD on 10/23/2024 at 2:10 PM         Assessment/Plan:  #1. Severe persistent asthma  We reviewed CT scan together in the office  Would continue nebs as is  Continue symbicort  Reviewed allergy screen together  If still having issues, with worsening of symptoms would start dupixent  All questions answered      No follow-ups on file.    I spent 30 minutes obtaining and reviewing records, preparing for the visit including reviewing chart and prior testing, face to face time examining the patient and obtaining history, counseling, arranging and reviewing office-based testing, independently reviewing relevant studies and documentation exclusive of other billable procedures.      Myke Henderson MD  12/16/2024